# Patient Record
(demographics unavailable — no encounter records)

---

## 2024-10-21 NOTE — HISTORY OF PRESENT ILLNESS
[de-identified] : 61-year-old male, accompanied by his wife, presents with atraumatic bilateral knee pain. Pt states the pain in the left knee is greater than the pain in the right knee. Pt reports that this pain began >5-6 years ago. He states that he takes Advil 600 mg to alleviate the pain, and that sitting alleviates the pain as well. He reports that pain is worse in the morning and with standing. Pt reports that he tried physical therapy approximately x 3-4 years ago without relief and received injections to the knees which provided temporary relief. Pt states that the pain is a stabbing pain and currently rates the pain a 0/10, but when he walks the pain increases to 8-9/10. He reports he can walk 1-1.5 blocks at maximum without stopping due to the pain. Pt states that the pain radiates to bilateral ankles, but there is more radiation to the right ankle than the left ankle. He reports low back pain and bilateral hip pain as well. Denies numbness, tingling, swelling, crackling/popping/catching, history of surgery on bilateral lower extremities.  Pt also reports that he fell 2.5 months ago during which time he fell onto his right shoulder and right knee. Patient states the right knee pain did not change after the fall, but he developed right shoulder pain after the incident. He states he had an x-ray done on the shoulder which had negative findings.

## 2024-10-21 NOTE — PHYSICAL EXAM
[de-identified] : His exam is alert and oriented.  He has a short shuffling gait and has obvious discomfort getting up from the exam table for the exam.  He has a stooped forward posture.  Knees have range of motion from 3 to 105 degrees.  No instability to the knee.  There is uncorrectable varus deformities.  Crepitus with range of motion.  He is neurologically intact and his feet are warm and well-perfused. [de-identified] : 4 views of the knee show significant arthritis with broad bone-on-bone apposition in the medial compartments with sclerosis and marginal osteophytes.

## 2024-10-21 NOTE — DISCUSSION/SUMMARY
[de-identified] : Severe bilateral knee arthritis.  I had a long discussion with the patient regarding knee arthritis / degenerative disease and treatment options. We reviewed the nature and etiology of degenerative knee degenerative disease.  We discussed the spectrum of symptomatic treatments. Our discussion included the use of appropriate exercises, activity modifications, weight reduction and maintenance, appropriate medication use, use of assistive devices, role of injections and avoidance of high impact activities.  Given the clinical symptoms, physical exam and imaging findings, we discussed the possibility of knee replacement surgery.  We reviewed the elective quality of life nature of the procedure and the details of the surgery, approach and the implants used, using the model  in the clinic. This included discussion of the material and fixation options. We also discussed the risks, benefits and expected and potential outcomes at length.  The details of those risks are below.  Category 1 includes risks that could occur in association with any operation. They include heart attack, stroke, blood clot and pulmonary embolism, wound infection, transfusion reaction, drug allergy, and complications related to anesthesia. This list is not intended to be complete but only to convey a broad range of general medical risks to be aware of.  Blood clots may lead to a block in circulation. A blood clot that completely blocks a large artery can lead to gangrene. If this happens an amputation may be required. Blood clots in leg veins lead to pain and swelling. If part of the clot breaks off it can travel to the brain and lead to a stroke. A clot can also travel to the lung, resulting in a pulmonary embolism. Medication after surgery will minimize but not eliminate these complications.  Category 2 is a list of risks and complications specifically related to total or partial joint replacement. This list is not complete but is intended to make the patient aware of the kinds of implant-related risks and complications that might occur.    1. If the device gets loose or wears out further surgery may be required to revise the prosthesis. 2. If an infection develops, further surgery to washout the joint, remove or replace parts, or insert an antibiotic spacer may be required 3. Muscle, Tendon, Nerve and blood vessel damage may result as a consequence of mobilization of the joint or dissection near these structures. These injuries can lead to weakness, numbness and paralysis. The damage may be temporary or permanent. The recovery process can be long and may require further procedures.   4. Dislocations and instability may also require further surgery.   5. Periprosthetic fracture requiring revision surgery. 6. Leg length discrepancy  7. Stiffness 8. Wound complications requiring either local wound care, revision surgery, or plastic surgery consultation  9. Chronic pain requiring pain management   Wants to consider further but feels he may be ready to proceed with surgery.  Discussed physical therapy but has done that in the past and feels like it worsened things so he prefers to continue work on home exercises.  He is can work on modalities we discussed above and will contact us if he would like to proceed or has any changes in condition or would like to discuss further.  All questions the patient and his spouse was present answered.   I, Dr. Boucher, personally performed the evaluation and management (E/M) services for this patient. That E/M includes conducting the clinically appropriate initial history &/or exam, assessing all conditions, and establishing the plan of care. Today,a PA Studentwas here to observe my evaluation and management service for this patient & follow plan of care established by me going forward.

## 2025-03-21 NOTE — HISTORY OF PRESENT ILLNESS
[de-identified] : Patient is a pleasant 61-year-old gentleman comes in for follow-up evaluation of her knees.  We previously evaluated from bilateral knee pain.  Continues to have significant pain.  He rates at a high level.  He says he cannot walk more than a block without stopping for pain.  He cannot really do stairs.  He denies night pain.  No instability.  No specific trauma or injury.  It is an aching dull type of pain with some stabbing pains as well.  He is using Advil and occasional acetaminophen.  He has started physical therapy though he admits he does not enjoy it.

## 2025-03-21 NOTE — DISCUSSION/SUMMARY
[de-identified] :  Patient is a pleasant 61-year-old gentleman bilateral knee arthritis more symptomatic than right.  We discussed the situation he is considered previous discussions he would like to proceed with elective arthroplasty.  Given his symptoms exam and imaging findings and lack of durable response to treatment I think this is reasonable.  I discussed the procedure risk benefits outcomes and recovery at length.  The risk discussed are shown below.    Category 1 includes risks that could occur in association with any operation. They include heart attack, stroke, blood clot and pulmonary embolism, wound infection, transfusion reaction, drug allergy, and complications related to anesthesia. This list is not intended to be complete but only to convey a broad range of general medical risks to be aware of.  Blood clots may lead to a block in circulation. A blood clot that completely blocks a large artery can lead to gangrene. If this happens an amputation may be required. Blood clots in leg veins lead to pain and swelling. If part of the clot breaks off it can travel to the brain and lead to a stroke. A clot can also travel to the lung, resulting in a pulmonary embolism. Medication after surgery will minimize but not eliminate these complications.  Category 2 is a list of risks and complications specifically related to total or partial joint replacement. This list is not complete but is intended to make the patient aware of the kinds of implant-related risks and complications that might occur.    1. If the device gets loose or wears out further surgery may be required to revise the prosthesis. 2. If an infection develops, further surgery to washout the joint, remove or replace parts, or insert an antibiotic spacer may be required 3. Muscle, Tendon, Nerve and blood vessel damage may result as a consequence of mobilization of the joint or dissection near these structures. These injuries can lead to weakness, numbness and paralysis. The damage may be temporary or permanent. The recovery process can be long and may require further procedures.   4. Dislocations and instability may also require further surgery.   5. Periprosthetic fracture requiring revision surgery. 6. Leg length discrepancy  7. Stiffness 8. Wound complications requiring either local wound care, revision surgery, or plastic surgery consultation  9. Chronic pain requiring pain management  We discussed the need for optimization and he has some scratches and we advised that at the time of surgery those would need to be resolved.  If he is having any chronic issue we suggested dermatology evaluation.  Would plan a home discharge but he would likely need an overnight stay.  Will likely use cemented implants will plan for resurfaced patella.  Patient expressed understanding of all this and strong desire to proceed.  All questions were answered.  Plan: Right total knee arthroplasty  Equipment: [employer] knee  Evaluations: PCP

## 2025-03-21 NOTE — PHYSICAL EXAM
[de-identified] : On physical exam he is alert and oriented.  He is neurologically intact and feet are warm well-perfused.  The knee has range of motion from 3 to 105 degrees.  There is no instability.  There is a few areas that appear to be scratches with eschar on the both legs. [de-identified] : 4 views of the knees are reviewed.  There is severe arthritis of both knees with broad bone-on-bone apposition of the medial compartment sclerosis marginal osteophyte formation.  Some subluxation and degenerative changes noted in the patellofemoral joints as well.

## 2025-03-26 NOTE — ADDENDUM
[FreeTextEntry1] : Next Visit: PVR, Uroflow, Renal US, possible cystoscopy  Entered by Karen Cast, acting as scribe and chaperone for Dr. Sidney Brand.   The documentation recorded by the scribe accurately reflects the service I personally performed and the decisions made by me.

## 2025-03-26 NOTE — HISTORY OF PRESENT ILLNESS
[FreeTextEntry1] : Patient is a 61-year-old  male who presents with irritative voiding symptoms, starting a few months ago. Recent PSA was 3.5ng/ml; free PSA 17%. Urinalysis also revealed 40-60 RBCs, negative culture. Patient is voiding with an adequate stream, with frequency, urgency with nocturia x 1-2. He denies gross hematuria, dysuria, fever or flank pain. He also reports that one testicle is bigger than the other, present for many years. He is not a smoker. He denies history of UTI or prostatitis. He denies family history of prostate cancer or kidney stones.

## 2025-03-26 NOTE — LETTER BODY
[Dear  ___] : Dear  [unfilled], [Consult Letter:] : I had the pleasure of evaluating your patient, [unfilled]. [Please see my note below.] : Please see my note below. [Consult Closing:] : Thank you very much for allowing me to participate in the care of this patient.  If you have any questions, please do not hesitate to contact me. [Sincerely,] : Sincerely, [FreeTextEntry3] : Sidney Brand MD

## 2025-03-26 NOTE — PHYSICAL EXAM
[Normal Appearance] : normal appearance [Well Groomed] : well groomed [General Appearance - In No Acute Distress] : no acute distress [Edema] : no peripheral edema [Respiration, Rhythm And Depth] : normal respiratory rhythm and effort [Exaggerated Use Of Accessory Muscles For Inspiration] : no accessory muscle use [Abdomen Soft] : soft [Costovertebral Angle Tenderness] : no ~M costovertebral angle tenderness [Abdomen Tenderness] : non-tender [Urinary Bladder Findings] : the bladder was normal on palpation [Normal Station and Gait] : the gait and station were normal for the patient's age [] : no rash [No Focal Deficits] : no focal deficits [Oriented To Time, Place, And Person] : oriented to person, place, and time [Affect] : the affect was normal [Mood] : the mood was normal [No Palpable Adenopathy] : no palpable adenopathy [Prostate Tenderness] : the prostate was not tender [No Prostate Nodules] : no prostate nodules [Prostate Size ___ (0-4)] : prostate size [unfilled] (scale: 0-4) [Penis Abnormality] : normal circumcised penis [Urethral Meatus] : meatus normal [Scrotum] : the scrotum was normal [de-identified] : Left testis lower than right, but equal size

## 2025-03-26 NOTE — ASSESSMENT
[FreeTextEntry1] : Patient presents with irritative voiding symptoms and microscopic hematuria. Physical exam revealed an enlarged prostate. He is voiding with significant post void residual. His clinical picture is consistent with BPH. We will start him on alfuzosin 10mg; we discussed potential side effects of nasal congestion and initial orthostatic dizziness with use. We also discussed reasons for microscopic hematuria. His urine was collected for urinalysis, urine culture, and cytology. He will follow-up in 2 months to reevaluate- he will have a renal ultrasound at that time to rule-out kidney stones. If today's urine studies again reveal hematuria, he will have a cystoscopic assessment on the next visit. If his voiding symptoms have not resolved or if he is unhappy with long-term medication use, the patient will be scheduled for formal evaluation with urodynamic studies, transrectal ultrasound, and cystoscopy for a better assessment of his lower urinary tract function and anatomy.

## 2025-05-28 NOTE — LETTER BODY
[Dear  ___] : Dear  [unfilled], [Please see my note below.] : Please see my note below. [Consult Closing:] : Thank you very much for allowing me to participate in the care of this patient.  If you have any questions, please do not hesitate to contact me. [Sincerely,] : Sincerely, [Courtesy Letter:] : I had the pleasure of seeing your patient, [unfilled], in my office today. [FreeTextEntry3] : Sidney Brand MD

## 2025-05-28 NOTE — ADDENDUM
[FreeTextEntry1] : Next Visit: PVR, Uroflow  Entered by Karen Cast, acting as scribe and chaperone for Dr. Sidney Brand.   The documentation recorded by the scribe accurately reflects the service I personally performed and the decisions made by me.

## 2025-05-28 NOTE — ASSESSMENT
[FreeTextEntry1] : Patient is stable clinically with improved in voiding symptoms but still significant post void residual off alfuzosin. Renal ultrasound today revealed a 1cm stone in the left kidney. We discussed that a stone this size will not pass. We discussed that his microscopic hematuria was likely related to his stone. He will be scheduled for a non contrast CT scan for better assessment of his renal anatomy. If he has significant stone burden we will reevaluate definitive treatments; if he has minimal stone burden, we will monitor his stones conservatively. He will restart daily alfuzosin. I encouraged him to maintain good fluid intake (2.5 liters a day) and intake of citrus products. Once we have the results of his imaging, we will call him to reevaluate.

## 2025-05-28 NOTE — HISTORY OF PRESENT ILLNESS
[FreeTextEntry1] : Patient comes in with improved voiding symptoms. He is voiding with an adequate stream, nocturia x 0-1, no flank pain, dysuria or hematuria. He was recently placed on furosemide with associated frequency. Patient took alfuzosin with improved voiding symptoms but discontinued use because he thought this would interfere with furosemide. Urinalysis on 5/26/25 revealed large blood, trace leukocyte esterase, 9 /HPF white blood cells, 40 /HPF red blood cells, calcium oxalate crystals present; urine culture and cytology were unremarkable. Today he will have a renal ultrasound.